# Patient Record
Sex: MALE | Race: BLACK OR AFRICAN AMERICAN | NOT HISPANIC OR LATINO | URBAN - METROPOLITAN AREA
[De-identification: names, ages, dates, MRNs, and addresses within clinical notes are randomized per-mention and may not be internally consistent; named-entity substitution may affect disease eponyms.]

---

## 2017-06-18 ENCOUNTER — EMERGENCY (EMERGENCY)
Facility: HOSPITAL | Age: 42
LOS: 0 days | Discharge: HOME | End: 2017-06-18

## 2017-06-18 DIAGNOSIS — K35.80 UNSPECIFIED ACUTE APPENDICITIS: ICD-10-CM

## 2017-06-28 DIAGNOSIS — M25.562 PAIN IN LEFT KNEE: ICD-10-CM

## 2019-04-18 ENCOUNTER — EMERGENCY (EMERGENCY)
Facility: HOSPITAL | Age: 44
LOS: 0 days | Discharge: HOME | End: 2019-04-18
Attending: EMERGENCY MEDICINE | Admitting: EMERGENCY MEDICINE
Payer: COMMERCIAL

## 2019-04-18 VITALS
TEMPERATURE: 97 F | OXYGEN SATURATION: 100 % | WEIGHT: 141.98 LBS | SYSTOLIC BLOOD PRESSURE: 131 MMHG | RESPIRATION RATE: 18 BRPM | HEART RATE: 75 BPM | DIASTOLIC BLOOD PRESSURE: 92 MMHG

## 2019-04-18 DIAGNOSIS — Z91.018 ALLERGY TO OTHER FOODS: ICD-10-CM

## 2019-04-18 DIAGNOSIS — R07.81 PLEURODYNIA: ICD-10-CM

## 2019-04-18 DIAGNOSIS — R10.9 UNSPECIFIED ABDOMINAL PAIN: ICD-10-CM

## 2019-04-18 LAB
ALBUMIN SERPL ELPH-MCNC: 4.7 G/DL — SIGNIFICANT CHANGE UP (ref 3.5–5.2)
ALP SERPL-CCNC: 64 U/L — SIGNIFICANT CHANGE UP (ref 30–115)
ALT FLD-CCNC: 16 U/L — SIGNIFICANT CHANGE UP (ref 0–41)
ANION GAP SERPL CALC-SCNC: 12 MMOL/L — SIGNIFICANT CHANGE UP (ref 7–14)
AST SERPL-CCNC: 26 U/L — SIGNIFICANT CHANGE UP (ref 0–41)
BASOPHILS # BLD AUTO: 0.04 K/UL — SIGNIFICANT CHANGE UP (ref 0–0.2)
BASOPHILS NFR BLD AUTO: 0.8 % — SIGNIFICANT CHANGE UP (ref 0–1)
BILIRUB SERPL-MCNC: <0.2 MG/DL — SIGNIFICANT CHANGE UP (ref 0.2–1.2)
BUN SERPL-MCNC: 8 MG/DL — LOW (ref 10–20)
CALCIUM SERPL-MCNC: 9.6 MG/DL — SIGNIFICANT CHANGE UP (ref 8.5–10.1)
CHLORIDE SERPL-SCNC: 102 MMOL/L — SIGNIFICANT CHANGE UP (ref 98–110)
CO2 SERPL-SCNC: 26 MMOL/L — SIGNIFICANT CHANGE UP (ref 17–32)
CREAT SERPL-MCNC: 1.1 MG/DL — SIGNIFICANT CHANGE UP (ref 0.7–1.5)
EOSINOPHIL # BLD AUTO: 0.23 K/UL — SIGNIFICANT CHANGE UP (ref 0–0.7)
EOSINOPHIL NFR BLD AUTO: 4.7 % — SIGNIFICANT CHANGE UP (ref 0–8)
GLUCOSE SERPL-MCNC: 106 MG/DL — HIGH (ref 70–99)
HCT VFR BLD CALC: 48.3 % — SIGNIFICANT CHANGE UP (ref 42–52)
HGB BLD-MCNC: 15.8 G/DL — SIGNIFICANT CHANGE UP (ref 14–18)
IMM GRANULOCYTES NFR BLD AUTO: 0 % — LOW (ref 0.1–0.3)
LACTATE SERPL-SCNC: 1.3 MMOL/L — SIGNIFICANT CHANGE UP (ref 0.5–2.2)
LYMPHOCYTES # BLD AUTO: 2.85 K/UL — SIGNIFICANT CHANGE UP (ref 1.2–3.4)
LYMPHOCYTES # BLD AUTO: 57.7 % — HIGH (ref 20.5–51.1)
MCHC RBC-ENTMCNC: 27.3 PG — SIGNIFICANT CHANGE UP (ref 27–31)
MCHC RBC-ENTMCNC: 32.7 G/DL — SIGNIFICANT CHANGE UP (ref 32–37)
MCV RBC AUTO: 83.4 FL — SIGNIFICANT CHANGE UP (ref 80–94)
MONOCYTES # BLD AUTO: 0.32 K/UL — SIGNIFICANT CHANGE UP (ref 0.1–0.6)
MONOCYTES NFR BLD AUTO: 6.5 % — SIGNIFICANT CHANGE UP (ref 1.7–9.3)
NEUTROPHILS # BLD AUTO: 1.5 K/UL — SIGNIFICANT CHANGE UP (ref 1.4–6.5)
NEUTROPHILS NFR BLD AUTO: 30.3 % — LOW (ref 42.2–75.2)
NRBC # BLD: 0 /100 WBCS — SIGNIFICANT CHANGE UP (ref 0–0)
PLATELET # BLD AUTO: 195 K/UL — SIGNIFICANT CHANGE UP (ref 130–400)
POTASSIUM SERPL-MCNC: 4.7 MMOL/L — SIGNIFICANT CHANGE UP (ref 3.5–5)
POTASSIUM SERPL-SCNC: 4.7 MMOL/L — SIGNIFICANT CHANGE UP (ref 3.5–5)
PROT SERPL-MCNC: 7.9 G/DL — SIGNIFICANT CHANGE UP (ref 6–8)
RBC # BLD: 5.79 M/UL — SIGNIFICANT CHANGE UP (ref 4.7–6.1)
RBC # FLD: 13.8 % — SIGNIFICANT CHANGE UP (ref 11.5–14.5)
SODIUM SERPL-SCNC: 140 MMOL/L — SIGNIFICANT CHANGE UP (ref 135–146)
TROPONIN T SERPL-MCNC: <0.01 NG/ML — SIGNIFICANT CHANGE UP
WBC # BLD: 4.94 K/UL — SIGNIFICANT CHANGE UP (ref 4.8–10.8)
WBC # FLD AUTO: 4.94 K/UL — SIGNIFICANT CHANGE UP (ref 4.8–10.8)

## 2019-04-18 PROCEDURE — 93010 ELECTROCARDIOGRAM REPORT: CPT

## 2019-04-18 PROCEDURE — 74177 CT ABD & PELVIS W/CONTRAST: CPT | Mod: 26

## 2019-04-18 PROCEDURE — 99284 EMERGENCY DEPT VISIT MOD MDM: CPT | Mod: 25

## 2019-04-18 RX ORDER — SODIUM CHLORIDE 9 MG/ML
3 INJECTION INTRAMUSCULAR; INTRAVENOUS; SUBCUTANEOUS ONCE
Qty: 0 | Refills: 0 | Status: COMPLETED | OUTPATIENT
Start: 2019-04-18 | End: 2019-04-18

## 2019-04-18 RX ORDER — MORPHINE SULFATE 50 MG/1
2 CAPSULE, EXTENDED RELEASE ORAL ONCE
Qty: 0 | Refills: 0 | Status: DISCONTINUED | OUTPATIENT
Start: 2019-04-18 | End: 2019-04-18

## 2019-04-18 RX ADMIN — SODIUM CHLORIDE 3 MILLILITER(S): 9 INJECTION INTRAMUSCULAR; INTRAVENOUS; SUBCUTANEOUS at 03:21

## 2019-04-18 NOTE — ED PROVIDER NOTE - PROGRESS NOTE DETAILS
Authored by Dr. Chambers received sign out from Dr. Lynch for continued care Authored by Dr. Chambers discussed with patient labs and imaging. Discussed signs and symptoms to return to the ED for. Discussed need to follow up with PMD. Pt understands and agrees with discharge plan

## 2019-04-18 NOTE — ED ADULT NURSE NOTE - NSIMPLEMENTINTERV_GEN_ALL_ED
Implemented All Universal Safety Interventions:  [place] to call system. Call bell, personal items and telephone within reach. Instruct patient to call for assistance. Room bathroom lighting operational. Non-slip footwear when patient is off stretcher. Physically safe environment: no spills, clutter or unnecessary equipment. Stretcher in lowest position, wheels locked, appropriate side rails in place.

## 2019-04-18 NOTE — ED PROVIDER NOTE - NSFOLLOWUPCLINICS_GEN_ALL_ED_FT
[facility]  Medicine  [address], [place]   Phone: [phone]  Fax:   Follow Up Time: 4-6 Days

## 2019-04-18 NOTE — ED PROVIDER NOTE - OBJECTIVE STATEMENT
44y M w/ no sig PMH presents with L. rib pain for the past 3 days. Does not radiate. Is worse with movement. Has tried 500mg motrin with minimal relief. Denies fever, chills, n/v/d, numbness/tingling.

## 2019-04-18 NOTE — ED ADULT NURSE REASSESSMENT NOTE - NS ED NURSE REASSESS COMMENT FT1
pt assessed A&Ox3, pt resting in bed , pt states he feels better, pt requesting IV to come out and [facility] to go [facility]

## 2019-04-18 NOTE — ED PROVIDER NOTE - NSFOLLOWUPINSTRUCTIONS_ED_ALL_ED_FT
Costochondritis  ImageCostochondritis is swelling and irritation (inflammation) of the tissue (cartilage) that connects your ribs to your breastbone (sternum). This causes pain in the front of your chest. Usually, the pain:    Starts gradually.  Is in more than one rib.    This condition usually goes away on its own over time.    Follow these instructions at home:  Do not do anything that makes your pain worse.  If directed, put ice on the painful area:    Put ice in a plastic bag.  Place a towel between your skin and the bag.  Leave the ice on for 20 minutes, 2–3 times a day.    If directed, put heat on the affected area as often as told by your doctor. Use the heat source that your doctor tells you to use, such as a moist heat pack or a heating pad.    Place a towel between your skin and the heat source.  Leave the heat on for 20–30 minutes.  Take off the heat if your skin turns bright red. This is very important if you cannot feel pain, heat, or cold. You may have a greater risk of getting burned.    Take over-the-counter and prescription medicines only as told by your doctor.  Return to your normal activities as told by your doctor. Ask your doctor what activities are safe for you.  Keep all follow-up visits as told by your doctor. This is important.  Contact a doctor if:  You have chills or a fever.  Your pain does not go away or it gets worse.  You have a cough that does not go away.  Get help right away if:  You are short of breath.  This information is not intended to replace advice given to you by your health care provider. Make sure you discuss any questions you have with your health care provider.

## 2019-04-18 NOTE — ED PROVIDER NOTE - ATTENDING CONTRIBUTION TO CARE
I personally evaluated the patient. I reviewed the Resident’s or Physician Assistant’s note (as assigned above), and agree with the findings and plan except as documented in my note.  44 yr M, with complaints of 3 days of left rib, LUQ abd pain. No CP, SOB, f/c/n/v, trauma. VS reviewed, pt well appearing, NAD. Head ncat, pharyngeal exam w/o erythema, edema or exudates. B/l TM wnl. normal s1s2 without any murmurs, Lungs CTAB with normal work of breathing. abd +BS, s/nd, + ttp of the left UQ and left lower lateral ribs, no guarding or rebound, extremities wnl, neuro exam grossly normal. No acute skin rashes. Plan is labs, imaging and reassess.

## 2019-04-18 NOTE — ED PROVIDER NOTE - PHYSICAL EXAMINATION
CONSTITUTIONAL: Well-developed; well-nourished; in no acute distress.   SKIN: warm, dry  HEAD: Normocephalic; atraumatic.  EYES: PERRL, EOMI, no conjunctival erythema  ENT: No nasal discharge; airway clear.  NECK: Supple; non tender.  CARD: S1, S2 normal; no murmurs, gallops, or rubs. Regular rate and rhythm.   RESP: No wheezes, rales or rhonchi.  ABD: soft mild tenderness to palpation of the LUQ.   EXT: Normal ROM.  No clubbing, cyanosis or edema. tenderness to palpation over the L. inferior ribs at midaxillary line.  LYMPH: No acute cervical adenopathy.  NEURO: Alert, oriented, grossly unremarkable  PSYCH: Cooperative, appropriate.

## 2019-04-18 NOTE — ED PROVIDER NOTE - NS ED ROS FT
Eyes:  No visual changes, eye pain or discharge.  ENMT:  No hearing changes, pain, no sore throat or runny nose, no difficulty swallowing  Cardiac:  No chest pain, SOB or edema. No chest pain with exertion.  Respiratory:  No cough or respiratory distress. No hemoptysis. No history of asthma or RAD.  GI:  No nausea, vomiting, diarrhea or abdominal pain.  :  No dysuria, frequency or burning.  MS:  No myalgia, muscle weakness, joint pain or back pain. + rib pain  Neuro:  No headache or weakness.  No LOC.  Skin:  No skin rash.   Endocrine: No history of thyroid disease or diabetes.

## 2022-08-09 NOTE — ED ADULT NURSE NOTE - PAIN: DESCRIPTION (FREQUENCY/QUALITY)
"Subjective:     Chief Complaint   Patient presents with   • Referral Needed     Low Sodium, pt wants to see specialist   • Requesting Labs     Routine labs   • GI Problem     Having Gi issues       HPI:   Yuly presents today to discuss the following.    H. pylori infection  Persistent issue  Completed H pylori treatment x2     Patient still feels very bloated  She takes probiotics    Last colonoscopy in 2012    Blood in the stool  New issue  Noted blood in the stool about 2 weeks ago  Last colonoscopy in [place] in 2011    Hyponatremia  Chronic issue  Needs referral renewal to endo      Past Medical History:   Diagnosis Date   • Cervical radiculopathy    • Foot pain, left    • High risk sexual behavior    • Insomnia, psychophysiological    • Menopause    • Tobacco dependence    • UTI (urinary tract infection)        Current Outpatient Medications Ordered in Epic   Medication Sig Dispense Refill   • rosuvastatin (CRESTOR) 10 MG Tab Take 1 Tablet by mouth every evening. 90 Tablet 3   • aspirin (ASA) 81 MG Chew Tab chewable tablet Take 81 mg by mouth every day.     • Omega-3 Fatty Acids (FISH OIL) 1000 MG Cap capsule Take 1,000 mg by mouth 3 times a day, with meals.       No current Epic-ordered facility-administered medications on file.       Allergies:  Patient has no known allergies.    Health Maintenance: Completed    ROS:  Gen: no fevers/chills, no changes in weight  Eyes: no changes in vision  Pulm: no sob, no cough  CV: no chest pain, no palpitations  GI: no nausea/vomiting, no diarrhea      Objective:     Exam:  /60 (BP Location: Left arm, Patient Position: Sitting, BP Cuff Size: Adult)   Pulse 85   Temp 36.3 °C (97.3 °F) (Temporal)   Ht 1.575 m (5' 2\")   Wt 49.9 kg (110 lb)   LMP 01/01/1989   SpO2 93%   BMI 20.12 kg/m²  Body mass index is 20.12 kg/m².      Constitutional: Alert, no distress, well-groomed.  Skin: Warm, dry, good turgor, no rashes in visible areas.  Eye: Equal, round and reactive, " conjunctiva clear, lids normal.  ENMT: Lips without lesions, good dentition, moist mucous membranes.  Neck: Trachea midline, no masses, no thyromegaly.  Respiratory: Unlabored respiratory effort, no cough.  MSK: Normal gait, moves all extremities.  Neuro: Grossly non-focal.   Psych: Alert and oriented x3, normal affect and mood.        Assessment & Plan:     73 y.o. female with the following -     1. H. pylori infection  Recurrent condition.  Completed treatment x2.  May benefit from EGD for direct biopsy.  - Referral to Gastroenterology    2. Blood in the stool  New problem.  Unknown etiology and prognosis.  Will refer to gastroenterology for repeat colonoscopy.  - Referral to Gastroenterology    3. Hyponatremia  Chronic condition.  Renewing referral to endocrinology for ongoing management of her possible SIADH.  - Referral to Endocrinology  - CBC WITHOUT DIFFERENTIAL; Future  - Basic Metabolic Panel; Future  - Lipid Profile; Future    4. Encounter for screening mammogram for breast cancer  - MA-SCREENING MAMMO BILAT W/ IMPLANTS W/CAD; Future    5. Mixed hyperlipidemia  Chronic condition.  Has been complaining of muscle soreness.  We will discontinue statin for 4 weeks and monitor.  - CBC WITHOUT DIFFERENTIAL; Future  - Basic Metabolic Panel; Future  - Lipid Profile; Future    6. Osteoporosis without current pathological fracture, unspecified osteoporosis type  - DS-BONE DENSITY STUDY (DEXA); Future      Return in about 3 months (around 11/9/2022).    Please note that this dictation was created using voice recognition software. I have made every reasonable attempt to correct obvious errors, but I expect that there are errors of grammar and possibly content that I did not discover before finalizing the note.       constant

## 2023-01-30 NOTE — ED PROVIDER NOTE - NS HIV RISK FACTOR YES
Refill denied   Pt no longer followed by Dr Fang   Followed at the [facility]    
Declined